# Patient Record
Sex: MALE | Race: WHITE | Employment: FULL TIME | URBAN - METROPOLITAN AREA
[De-identification: names, ages, dates, MRNs, and addresses within clinical notes are randomized per-mention and may not be internally consistent; named-entity substitution may affect disease eponyms.]

---

## 2020-01-09 ENCOUNTER — OFFICE VISIT (OUTPATIENT)
Dept: PRIMARY CARE CLINIC | Age: 25
End: 2020-01-09

## 2020-01-09 VITALS
DIASTOLIC BLOOD PRESSURE: 74 MMHG | SYSTOLIC BLOOD PRESSURE: 114 MMHG | HEART RATE: 61 BPM | RESPIRATION RATE: 16 BRPM | HEIGHT: 73 IN | TEMPERATURE: 97.9 F | OXYGEN SATURATION: 97 % | WEIGHT: 188.8 LBS | BODY MASS INDEX: 25.02 KG/M2

## 2020-01-09 DIAGNOSIS — R05.9 COUGH: Primary | ICD-10-CM

## 2020-01-09 RX ORDER — IBUPROFEN 600 MG/1
600 TABLET ORAL
COMMUNITY
Start: 2015-01-25 | End: 2020-12-16 | Stop reason: ALTCHOICE

## 2020-01-09 RX ORDER — FLUTICASONE PROPIONATE 220 UG/1
1 AEROSOL, METERED RESPIRATORY (INHALATION)
COMMUNITY
End: 2020-12-16 | Stop reason: ALTCHOICE

## 2020-01-09 RX ORDER — FLUTICASONE PROPIONATE 50 MCG
2 SPRAY, SUSPENSION (ML) NASAL
COMMUNITY
Start: 2015-01-25 | End: 2020-12-16 | Stop reason: ALTCHOICE

## 2020-01-09 RX ORDER — PREDNISONE 10 MG/1
TABLET ORAL
COMMUNITY
Start: 2015-05-06 | End: 2020-12-16 | Stop reason: ALTCHOICE

## 2020-01-09 RX ORDER — AMOXICILLIN AND CLAVULANATE POTASSIUM 875; 125 MG/1; MG/1
TABLET, FILM COATED ORAL
COMMUNITY
Start: 2019-11-24 | End: 2020-12-16 | Stop reason: ALTCHOICE

## 2020-01-09 RX ORDER — PREDNISONE 20 MG/1
TABLET ORAL
COMMUNITY
Start: 2019-11-24 | End: 2020-12-16 | Stop reason: ALTCHOICE

## 2020-01-09 RX ORDER — MONTELUKAST SODIUM 10 MG/1
10 TABLET ORAL
COMMUNITY
End: 2020-12-16 | Stop reason: ALTCHOICE

## 2020-01-09 RX ORDER — ALBUTEROL SULFATE 90 UG/1
1 AEROSOL, METERED RESPIRATORY (INHALATION)
COMMUNITY
Start: 2015-05-06 | End: 2020-01-09 | Stop reason: SDUPTHER

## 2020-01-09 RX ORDER — AZITHROMYCIN 250 MG/1
TABLET, FILM COATED ORAL
COMMUNITY
Start: 2015-05-06 | End: 2020-12-16 | Stop reason: ALTCHOICE

## 2020-01-09 RX ORDER — BENZONATATE 200 MG/1
200 CAPSULE ORAL
Qty: 21 CAP | Refills: 0 | Status: SHIPPED | OUTPATIENT
Start: 2020-01-09 | End: 2020-01-16

## 2020-01-09 RX ORDER — SERTRALINE HYDROCHLORIDE 50 MG/1
TABLET, FILM COATED ORAL
COMMUNITY
Start: 2019-10-23

## 2020-01-09 RX ORDER — BUDESONIDE AND FORMOTEROL FUMARATE DIHYDRATE 80; 4.5 UG/1; UG/1
2 AEROSOL RESPIRATORY (INHALATION)
COMMUNITY
Start: 2015-05-06 | End: 2020-12-16 | Stop reason: ALTCHOICE

## 2020-01-09 NOTE — PROGRESS NOTES
Subjective:   Kevan Ho III is a 22 y.o. male who complains of coryza, congestion and dry cough for 5 days, stable since that time. He denies a history of shortness of breath and wheezing. Evaluation to date: none. Treatment to date: OTC products. Patient does not smoke cigarettes. Relevant PMH: No pertinent additional PMH. Patient Active Problem List   Diagnosis Code    RAD (reactive airway disease) J45.909    Acne L70.9    Other tear of cartilage or meniscus of knee, current [id number]    Right hip pain M25.551     Patient Active Problem List    Diagnosis Date Noted    Right hip pain 12/12/2013    Other tear of cartilage or meniscus of knee, current 08/22/2013    Acne 07/30/2013    RAD (reactive airway disease) 08/02/2011     Current Outpatient Medications   Medication Sig Dispense Refill    ibuprofen (MOTRIN) 600 mg tablet Take 600 mg by mouth.  fluticasone propionate (FLOVENT HFA) 220 mcg/actuation inhaler Take 1 Puff by inhalation.  sertraline (ZOLOFT) 50 mg tablet       benzonatate (TESSALON) 200 mg capsule Take 1 Cap by mouth three (3) times daily as needed for Cough for up to 7 days. 21 Cap 0    albuterol (VENTOLIN HFA) 90 mcg/actuation inhaler Take 2 Puffs by inhalation every four (4) hours as needed for Wheezing. 3 Inhaler 4    beclomethasone (QVAR) 80 mcg/actuation inhaler Take 1 Puff by inhalation two (2) times a day. 1 Inhaler 5    amoxicillin-clavulanate (AUGMENTIN) 875-125 mg per tablet       azithromycin (ZITHROMAX) 250 mg tablet Take tablet a day for the next four days. The medication remains active in your system for a total of ten days. Take your first dose in the evening of 5/6/15.  budesonide-formoterol (SYMBICORT) 80-4.5 mcg/actuation HFAA Take 2 Puffs by inhalation.  fluticasone propionate (FLONASE) 50 mcg/actuation nasal spray 2 Sprays by Nasal route.  montelukast (SINGULAIR) 10 mg tablet Take 10 mg by mouth.       predniSONE ([place] Chill) 20 mg tablet       predniSONE (DELTASONE) 10 mg tablet Take three tabs twice a day with food for 3 days.  diclofenac EC (VOLTAREN) 75 mg EC tablet Take 1 Tab by mouth two (2) times a day. 60 Tab 0    oxyCODONE IR (ROXICODONE) 5 mg immediate release tablet Take 1-2 Tabs by mouth every four (4) hours as needed for Pain. Max Daily Amount: 60 mg. 50 Tab 0    omega-3 fatty acids-vitamin e 1,000 mg cap Take 1 Cap by mouth daily.  sertraline (ZOLOFT) 25 mg tablet Take 25 mg by mouth daily. Allergies   Allergen Reactions    Meperidine Rash and Hives     Past Medical History:   Diagnosis Date    Asthma      Past Surgical History:   Procedure Laterality Date    HX HEENT      WISDOM TEETH    HX UROLOGICAL  1997    LEFT KIDNEY SURGERY FOR UPJ OBSTRUCTION     Family History   Problem Relation Age of Onset    Anesth Problems Mother         VOMITING    Heart Disease Maternal Grandmother      Social History     Tobacco Use    Smoking status: Never Smoker    Smokeless tobacco: Never Used   Substance Use Topics    Alcohol use: Yes     Alcohol/week: 1.0 standard drinks     Types: 1 Shots of liquor per week     Comment: once a month        Review of Systems  Pertinent items are noted in HPI. Objective:     Visit Vitals  /74 (BP 1 Location: Left arm, BP Patient Position: Sitting)   Pulse 61   Temp 97.9 °F (36.6 °C) (Oral)   Resp 16   Ht 6' 1\" (1.854 m)   Wt 188 lb 12.8 oz (85.6 kg)   SpO2 97%   BMI 24.91 kg/m²     General:  alert, cooperative, no distress   Eyes: conjunctivae/corneas clear. PERRL, EOM's intact. Fundi benign   Ears: normal TM's and external ear canals AU   Sinuses: Normal paranasal sinuses without tenderness   Mouth:  Lips, mucosa, and tongue normal. Teeth and gums normal   Neck: supple, symmetrical, trachea midline and no adenopathy. Heart: S1 and S2 normal, no murmurs noted. Lungs: clear to auscultation bilaterally   Abdomen: soft, non-tender.  Bowel sounds normal. No masses, no organomegaly        Assessment/Plan:   viral upper respiratory illness  Suggested symptomatic OTC remedies. RTC prn. Discussed diagnosis and treatment of viral URIs. Discussed the importance of avoiding unnecessary antibiotic therapy. ICD-10-CM ICD-9-CM    1. Cough R05 786.2 benzonatate (TESSALON) 200 mg capsule   .

## 2020-01-09 NOTE — PROGRESS NOTES
Brooke Grubbs III is a 22 y.o. male    Room:5    Chief Complaint   Patient presents with    Chest Pain     Pt States \" i have been experiencing chest tightness, chest congestion, and cough but cant cough anything up w/ sore throat, x3 days it has been worse today than the past x3 days and have asthma\". Visit Vitals  /74 (BP 1 Location: Left arm, BP Patient Position: Sitting)   Pulse 61   Temp 97.9 °F (36.6 °C) (Oral)   Resp 16   Ht 6' 1\" (1.854 m)   Wt 188 lb 12.8 oz (85.6 kg)   SpO2 97%   BMI 24.91 kg/m²       Pain Scale: 0 - No pain/10    1. Have you been to the ER, urgent care clinic since your last visit? Hospitalized since your last visit? No    2. Have you seen or consulted any other health care providers outside of the [address] [place] [address] since your last visit? Include any pap smears or colon screening.  No

## 2020-01-09 NOTE — PATIENT INSTRUCTIONS
Cough: Care Instructions  Your Care Instructions    A cough is your body's response to something that bothers your throat or airways. Many things can cause a cough. You might cough because of a cold or the flu, bronchitis, or asthma. Smoking, postnasal drip, allergies, and stomach acid that backs up into your throat also can cause coughs. A cough is a symptom, not a disease. Most coughs stop when the cause, such as a cold, goes away. You can take a few steps at home to cough less and feel better. Follow-up care is a key part of your treatment and safety. Be sure to make and go to all appointments, and call your doctor if you are having problems. It's also a good idea to know your test results and keep a list of the medicines you take. How can you care for yourself at home? · Drink lots of water and other fluids. This helps thin the mucus and soothes a dry or sore throat. Honey or lemon juice in hot water or tea may ease a dry cough. · Take cough medicine as directed by your doctor. · Prop up your head on pillows to help you breathe and ease a dry cough. · Try cough drops to soothe a dry or sore throat. Cough drops don't stop a cough. Medicine-flavored cough drops are no better than candy-flavored drops or hard candy. · Do not smoke. Avoid secondhand smoke. If you need help quitting, talk to your doctor about stop-smoking programs and medicines. These can increase your chances of quitting for good. When should you call for help? Call 911 anytime you think you may need emergency care.  For example, call if:    · You have severe trouble breathing.    Call your doctor now or seek immediate medical care if:    · You cough up blood.     · You have new or worse trouble breathing.     · You have a new or higher fever.     · You have a new rash.    Watch closely for changes in your health, and be sure to contact your doctor if:    · You cough more deeply or more often, especially if you notice more mucus or a change in the color of your mucus.     · You have new symptoms, such as a sore throat, an earache, or sinus pain.     · You do not get better as expected. Where can you learn more? Go to http://destini-nazia.info/. Enter D279 in the search box to learn more about \"Cough: Care Instructions. \"  Current as of: June 9, 2019  Content Version: 12.2  © 6462-7117 [employer]. Care instructions adapted under license by [employer] (which disclaims liability or warranty for this information). If you have questions about a medical condition or this instruction, always ask your healthcare professional. [address] any warranty or liability for your use of this information.

## 2020-07-04 ENCOUNTER — HOSPITAL ENCOUNTER (EMERGENCY)
Age: 25
Discharge: HOME | End: 2020-07-05
Attending: EMERGENCY MEDICINE | Admitting: EMERGENCY MEDICINE
Payer: OTHER MISCELLANEOUS
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
## 2020-07-04 DIAGNOSIS — S61.412A LACERATION OF LEFT HAND WITHOUT FOREIGN BODY, INITIAL ENCOUNTER: ICD-10-CM

## 2020-07-04 DIAGNOSIS — T07.XXXA MULTIPLE CONTUSIONS: ICD-10-CM

## 2020-07-04 PROCEDURE — 99284 EMERGENCY DEPT VISIT MOD MDM: CPT

## 2020-07-04 PROCEDURE — 75810000293 HC SIMP/SUPERF WND  RPR

## 2020-07-05 ENCOUNTER — APPOINTMENT (OUTPATIENT)
Dept: GENERAL RADIOLOGY | Age: 25
End: 2020-07-05
Attending: EMERGENCY MEDICINE
Payer: OTHER MISCELLANEOUS

## 2020-07-05 VITALS
TEMPERATURE: 98.4 F | BODY MASS INDEX: 23.13 KG/M2 | HEIGHT: 75 IN | RESPIRATION RATE: 16 BRPM | WEIGHT: 186 LBS | HEART RATE: 98 BPM | DIASTOLIC BLOOD PRESSURE: 75 MMHG | SYSTOLIC BLOOD PRESSURE: 130 MMHG | OXYGEN SATURATION: 100 %

## 2020-07-05 LAB
ALBUMIN SERPL-MCNC: 4.3 G/DL (ref 3.5–5)
ALBUMIN/GLOB SERPL: 1.2 {RATIO} (ref 1.1–2.2)
ALP SERPL-CCNC: 47 U/L (ref 45–117)
ALT SERPL-CCNC: 18 U/L (ref 12–78)
ANION GAP SERPL CALC-SCNC: 6 MMOL/L (ref 5–15)
AST SERPL-CCNC: 8 U/L (ref 15–37)
ATRIAL RATE: 95 BPM
BASOPHILS # BLD: 0.1 K/UL (ref 0–0.1)
BASOPHILS NFR BLD: 1 % (ref 0–1)
BILIRUB SERPL-MCNC: 0.5 MG/DL (ref 0.2–1)
BUN SERPL-MCNC: 9 MG/DL (ref 6–20)
BUN/CREAT SERPL: 7 (ref 12–20)
CALCIUM SERPL-MCNC: 8.9 MG/DL (ref 8.5–10.1)
CALCULATED P AXIS, ECG09: 73 DEGREES
CALCULATED R AXIS, ECG10: 88 DEGREES
CALCULATED T AXIS, ECG11: 47 DEGREES
CHLORIDE SERPL-SCNC: 106 MMOL/L (ref 97–108)
CO2 SERPL-SCNC: 26 MMOL/L (ref 21–32)
CREAT SERPL-MCNC: 1.26 MG/DL (ref 0.7–1.3)
DIAGNOSIS, 93000: NORMAL
DIFFERENTIAL METHOD BLD: ABNORMAL
EOSINOPHIL # BLD: 0.1 K/UL (ref 0–0.4)
EOSINOPHIL NFR BLD: 0 % (ref 0–7)
ERYTHROCYTE [DISTWIDTH] IN BLOOD BY AUTOMATED COUNT: 11.6 % (ref 11.5–14.5)
GLOBULIN SER CALC-MCNC: 3.7 G/DL (ref 2–4)
GLUCOSE SERPL-MCNC: 111 MG/DL (ref 65–100)
HCT VFR BLD AUTO: 45.4 % (ref 36.6–50.3)
HGB BLD-MCNC: 15.7 G/DL (ref 12.1–17)
IMM GRANULOCYTES # BLD AUTO: 0 K/UL (ref 0–0.04)
IMM GRANULOCYTES NFR BLD AUTO: 0 % (ref 0–0.5)
LYMPHOCYTES # BLD: 1.1 K/UL (ref 0.8–3.5)
LYMPHOCYTES NFR BLD: 9 % (ref 12–49)
MCH RBC QN AUTO: 29.9 PG (ref 26–34)
MCHC RBC AUTO-ENTMCNC: 34.6 G/DL (ref 30–36.5)
MCV RBC AUTO: 86.5 FL (ref 80–99)
MONOCYTES # BLD: 0.8 K/UL (ref 0–1)
MONOCYTES NFR BLD: 7 % (ref 5–13)
NEUTS SEG # BLD: 9.4 K/UL (ref 1.8–8)
NEUTS SEG NFR BLD: 83 % (ref 32–75)
NRBC # BLD: 0 K/UL (ref 0–0.01)
NRBC BLD-RTO: 0 PER 100 WBC
P-R INTERVAL, ECG05: 144 MS
PLATELET # BLD AUTO: 214 K/UL (ref 150–400)
PMV BLD AUTO: 9.8 FL (ref 8.9–12.9)
POTASSIUM SERPL-SCNC: 3.6 MMOL/L (ref 3.5–5.1)
PROT SERPL-MCNC: 8 G/DL (ref 6.4–8.2)
Q-T INTERVAL, ECG07: 330 MS
QRS DURATION, ECG06: 82 MS
QTC CALCULATION (BEZET), ECG08: 414 MS
RBC # BLD AUTO: 5.25 M/UL (ref 4.1–5.7)
SODIUM SERPL-SCNC: 138 MMOL/L (ref 136–145)
VENTRICULAR RATE, ECG03: 95 BPM
WBC # BLD AUTO: 11.4 K/UL (ref 4.1–11.1)

## 2020-07-05 PROCEDURE — 85025 COMPLETE CBC W/AUTO DIFF WBC: CPT

## 2020-07-05 PROCEDURE — 93005 ELECTROCARDIOGRAM TRACING: CPT

## 2020-07-05 PROCEDURE — 71045 X-RAY EXAM CHEST 1 VIEW: CPT

## 2020-07-05 PROCEDURE — 36415 COLL VENOUS BLD VENIPUNCTURE: CPT

## 2020-07-05 PROCEDURE — 80053 COMPREHEN METABOLIC PANEL: CPT

## 2020-07-05 NOTE — ED NOTES
Patient was provided with discharge instructions. Instructions and any medications were reviewed with the patient &/or family by Dr. Bird Akers. Questions and concerns addressed by the provider. Patient discharged in stable condition via ambulation  and was escorted by his mother.

## 2020-07-05 NOTE — DISCHARGE INSTRUCTIONS

## 2020-07-05 NOTE — ED NOTES
Wound care being done to hands at this time. Concern for glass shard in the palm of the left hand. MD at bedside.
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
## 2020-12-16 ENCOUNTER — OFFICE VISIT (OUTPATIENT)
Dept: PRIMARY CARE CLINIC | Age: 25
End: 2020-12-16

## 2020-12-16 VITALS
HEART RATE: 91 BPM | WEIGHT: 191.4 LBS | BODY MASS INDEX: 23.8 KG/M2 | OXYGEN SATURATION: 97 % | TEMPERATURE: 98 F | SYSTOLIC BLOOD PRESSURE: 108 MMHG | RESPIRATION RATE: 18 BRPM | HEIGHT: 75 IN | DIASTOLIC BLOOD PRESSURE: 79 MMHG

## 2020-12-16 DIAGNOSIS — Z20.2 EXPOSURE TO STD: ICD-10-CM

## 2020-12-16 DIAGNOSIS — N50.811 PAIN IN RIGHT TESTICLE: Primary | ICD-10-CM

## 2020-12-16 DIAGNOSIS — Z23 NEEDS FLU SHOT: ICD-10-CM

## 2020-12-16 DIAGNOSIS — N45.1 EPIDIDYMITIS: ICD-10-CM

## 2020-12-16 PROCEDURE — 99213 OFFICE O/P EST LOW 20 MIN: CPT | Performed by: NURSE PRACTITIONER

## 2020-12-16 PROCEDURE — 90471 IMMUNIZATION ADMIN: CPT | Performed by: NURSE PRACTITIONER

## 2020-12-16 PROCEDURE — 90686 IIV4 VACC NO PRSV 0.5 ML IM: CPT | Performed by: NURSE PRACTITIONER

## 2020-12-16 RX ORDER — DOXYCYCLINE 100 MG/1
100 CAPSULE ORAL 2 TIMES DAILY
Qty: 20 CAP | Refills: 0 | Status: SHIPPED | OUTPATIENT
Start: 2020-12-16 | End: 2020-12-26

## 2020-12-16 NOTE — PATIENT INSTRUCTIONS
Testicular Pain: Care Instructions  Your Care Instructions     Pain in the testicles can be caused by many things. These include an injury to your testicles, an infection, and testicular torsion. Injuries and genital problems most often happen during sports or recreational activities, at work, or in a fall. Pain caused by an injury usually goes away quickly. There is usually no long-term harm to your testicles. Infections that may cause pain include:  · An infection of the testicles. This is called orchitis. · An abscess in the scrotum or testicles. · Some sexually transmitted infections (STIs). · A swelling of the tube attached to a testicle. This swelling is called epididymitis. It can cause pain and is sometimes caused by an infection. Testicular torsion happens when a testicle twists on the spermatic cord. This cuts off the blood supply to the testicle. This is a serious condition that requires surgery. Follow-up care is a key part of your treatment and safety. Be sure to make and go to all appointments, and call your doctor if you are having problems. It's also a good idea to know your test results and keep a list of the medicines you take. How can you care for yourself at home? · Rest and protect your testicles and groin. Stop, change, or take a break from any activity that may be causing your pain or soreness. · Put ice or a cold pack on the area for 10 to 20 minutes at a time. Put a thin cloth between the ice and your skin. · Wear briefs, not boxers. Briefs help support the injured area. You can use a jock strap if it helps relieve your pain. · If your doctor prescribed antibiotics, take them as directed. Do not stop taking them just because you feel better. You need to take the full course of antibiotics. · Ask your doctor if you can take an over-the-counter pain medicine, such as acetaminophen (Tylenol), ibuprofen (Advil, Motrin), or naproxen (Aleve). Be safe with medicines.  Read and follow all instructions on the label. · If the doctor gave you a prescription medicine for pain, take it as prescribed. When should you call for help? Call your doctor now or seek immediate medical care if:    · You have severe or increasing pain.     · You notice a change in how your testicles look or are positioned in your scrotum.     · You notice new or worse swelling in your scrotum.     · You have symptoms of a urinary problem, such as a urinary tract infection. These may include:  ? Pain or burning when you urinate. ? A frequent need to urinate without being able to pass much urine. ? Pain in the flank, which is just below the rib cage and above the waist on either side of the back. ? Blood in your urine. ? A fever. Watch closely for changes in your health, and be sure to contact your doctor if:    · You do not get better as expected. Where can you learn more? Go to http://www.gray.com/  Enter X349 in the search box to learn more about \"Testicular Pain: Care Instructions. \"  Current as of: June 29, 2020               Content Version: 12.6  © 1155-9368 [employer]. Care instructions adapted under license by [employer] (which disclaims liability or warranty for this information). If you have questions about a medical condition or this instruction, always ask your healthcare professional. [place] [address] any warranty or liability for your use of this information. Vaccine Information Statement    Influenza (Flu) Vaccine (Inactivated or Recombinant): What You Need to Know    Many Vaccine Information Statements are available in [place]ian and other languages. See www.immunize.org/vis  Hojas de información sobre vacunas están disponibles en español y en muchos otros idiomas. Visite www.immunize.org/vis    1. Why get vaccinated? Influenza vaccine can prevent influenza (flu).     Flu is a contagious disease that spreads around the [facility] every year, usually between October and May. Anyone can get the flu, but it is more dangerous for some people. Infants and young children, people 72years of age and older, pregnant women, and people with certain health conditions or a weakened immune system are at greatest risk of flu complications. Pneumonia, bronchitis, sinus infections and ear infections are examples of flu-related complications. If you have a medical condition, such as heart disease, cancer or diabetes, flu can make it worse. Flu can cause fever and chills, sore throat, muscle aches, fatigue, cough, headache, and runny or stuffy nose. Some people may have vomiting and diarrhea, though this is more common in children than adults. Each year thousands of people in the [facility] die from flu, and many more are hospitalized. Flu vaccine prevents millions of illnesses and flu-related visits to the doctor each year. 2. Influenza vaccines     CDC recommends everyone 10months of age and older get vaccinated every flu season. Children 6 months through 6years of age may need 2 doses during a single flu season. Everyone else needs only 1 dose each flu season. It takes about 2 weeks for protection to develop after vaccination. There are many flu viruses, and they are always changing. Each year a new flu vaccine is made to protect against three or four viruses that are likely to cause disease in the upcoming flu season. Even when the vaccine doesnt exactly match these viruses, it may still provide some protection. Influenza vaccine does not cause flu. Influenza vaccine may be given at the same time as other vaccines. 3. Talk with your health care provider    Tell your vaccine provider if the person getting the vaccine:   Has had an allergic reaction after a previous dose of influenza vaccine, or has any severe, life-threatening allergies.  Has ever had Guillain-Barré Syndrome (also called GBS).     In some cases, your health care provider may decide to postpone influenza vaccination to a future visit. People with minor illnesses, such as a cold, may be vaccinated. People who are moderately or severely ill should usually wait until they recover before getting influenza vaccine. Your health care provider can give you more information. 4. Risks of a reaction     Soreness, redness, and swelling where shot is given, fever, muscle aches, and headache can happen after influenza vaccine.  There may be a very small increased risk of Guillain-Barré Syndrome (GBS) after inactivated influenza vaccine (the flu shot). Yee Current children who get the flu shot along with pneumococcal vaccine (PCV13), and/or DTaP vaccine at the same time might be slightly more likely to have a seizure caused by fever. Tell your health care provider if a child who is getting flu vaccine has ever had a seizure. People sometimes faint after medical procedures, including vaccination. Tell your provider if you feel dizzy or have vision changes or ringing in the ears. As with any medicine, there is a very remote chance of a vaccine causing a severe allergic reaction, other serious injury, or death. 5. What if there is a serious problem? An allergic reaction could occur after the vaccinated person leaves the clinic. If you see signs of a severe allergic reaction (hives, swelling of the face and throat, difficulty breathing, a fast heartbeat, dizziness, or weakness), call 9-1-1 and get the person to the nearest hospital.    For other signs that concern you, call your health care provider. Adverse reactions should be reported to the Vaccine Adverse Event Reporting System (VAERS). Your health care provider will usually file this report, or you can do it yourself. Visit the VAERS website at www.vaers. hhs.gov or call 3-491.870.5694. VAERS is only for reporting reactions, and VAERS staff do not give medical advice.     6. The [facility] Vaccine Injury Compensation Program    The National Vaccine Injury Compensation Program (VICP) is a federal program that was created to compensate people who may have been injured by certain vaccines. Visit the VICP website at www.hrsa.gov/vaccinecompensation or call 6-617.612.9615 to learn about the program and about filing a claim. There is a time limit to file a claim for compensation. 7. How can I learn more?  Ask your health care provider.  Call your local or state health department.  Contact the Centers for Disease Control and Prevention (CDC):  - Call 0-756.365.3256 (1-800-CDC-INFO) or  - Visit CDCs influenza website at www.cdc.gov/flu    Vaccine Information Statement (Interim)  Inactivated Influenza Vaccine   8/15/2019  42 JASON No [id number]   Department of Health and Human Services  Centers for Disease Control and Prevention    Office Use Only

## 2020-12-16 NOTE — PROGRESS NOTES
HISTORY OF PRESENT ILLNESS  Shayy Mobley III is a 26 y.o. male. Right testicle pain x 2 weeks . Also states he had bloody ejaculate yesterday. Asked mother (who works for urology) about it. Stated it is common, No edema, trauma, no redness. Pain is more an ache on the right with no radiation. Worse when wearing work pants with all the gear on it. Noticed not bulging. Not worse with weight lifting. No fever, no pain with urination or with bowel movement. Did report having treatment for chlamydia 1 month ago. Took appropriated Rx course. Has unprotected sex 50% of the time. Groin Pain  The history is provided by the patient. This is a new problem. The current episode started more than 1 week ago. The problem occurs daily. The problem has not changed since onset. Pertinent negatives include no abdominal pain and no headaches. Past Medical History:   Diagnosis Date    Asthma      Past Surgical History:   Procedure Laterality Date    HX HEENT      WISDOM TEETH    HX UROLOGICAL  1997    LEFT KIDNEY SURGERY FOR UPJ OBSTRUCTION     Allergies   Allergen Reactions    Meperidine Rash and Hives         Review of Systems   Constitutional: Negative for fever, malaise/fatigue and weight loss. Gastrointestinal: Negative for abdominal pain and blood in stool. Genitourinary: Negative for dysuria, flank pain and hematuria. Musculoskeletal: Negative for myalgias. Neurological: Negative for dizziness and headaches. All other systems reviewed and are negative. Physical Exam  Vitals signs and nursing note reviewed. Constitutional:       General: He is not in acute distress. Appearance: Normal appearance. He is normal weight. HENT:      Head: Normocephalic and atraumatic. Eyes:      Pupils: Pupils are equal, round, and reactive to light. Neck:      Musculoskeletal: Normal range of motion. Cardiovascular:      Rate and Rhythm: Normal rate.    Pulmonary:      Effort: Pulmonary effort is normal.   Abdominal:      General: Abdomen is flat. There is no distension. Hernia: No hernia is present. There is no hernia in the right inguinal area. Genitourinary:     Penis: Normal and circumcised. No tenderness or swelling. Testes: Normal.         Right: Mass, tenderness, swelling, testicular hydrocele or varicocele not present. Epididymis:      Right: Normal.      Comments: Normal testicular exam.  Lymphadenopathy:      Lower Body: No right inguinal adenopathy. Skin:     General: Skin is warm. Neurological:      Mental Status: He is alert. ASSESSMENT and PLAN    ICD-10-CM ICD-9-CM    1. Pain in right testicle  N50.811 608.9 US SCROTUM/TESTICLES      CHLAMYDIA/GC PCR   2. Epididymitis  N45.1 604.90    3. Exposure to STD  Z20.2 V01.6 CHLAMYDIA/GC PCR   4. Needs flu shot  Z23 V04.81 INFLUENZA VIRUS VAC QUAD,SPLIT,PRESV FREE SYRINGE IM     Encounter Diagnoses   Name Primary?  Pain in right testicle Yes    Epididymitis     Exposure to STD     Needs flu shot      Orders Placed This Encounter    CHLAMYDIA/GC PCR    US SCROTUM/TESTICLES    Influenza Virus Vaccine QUAD, PF Syr 6 Months + (Flulaval, Fluzone, Fluarix [id number])    doxycycline (VIBRAMYCIN) 100 mg capsule   Medication as directed  Ultrasound- will call with results  Will call with Urine results  I have discussed with the patient the diagnosis  and intended plan as seen in the orders. Patient received AVS , all questions answered concerning all future plans. I have discussed medication side effects and warnings with the patient/ guardian. Patient instructed to go to ER if symptoms worsen or fail to improve.   Signed By: FAUSTO Vivas     December 16, 2020      Signed By: FAUSTO Vivas     December 16, 2020

## 2020-12-16 NOTE — PROGRESS NOTES
Chief Complaint   Patient presents with    Groin Pain     Pt states he has had pain for 1-2 weeks in right testicle, not a sharp pain, but a nagging pain. Pt has recently started lifting weights. 1. Have you been to the ER, urgent care clinic since your last visit? Hospitalized since your last visit? [facility] 1 month ago for chlamydia, finished the course of antibiotics    2. Have you seen or consulted any other health care providers outside of the [address] [place] [address] since your last visit? Include any pap smears or colon screening. Yes When: Dr. Jeri Solis PCP 4 months ago    Visit Vitals  /79 (BP 1 Location: Left arm, BP Patient Position: Sitting)   Pulse 91   Temp 98 °F (36.7 °C) (Oral)   Resp 18   Ht 6' 3\" (1.905 m)   Wt 191 lb 6.4 oz (86.8 kg)   SpO2 97%   BMI 23.92 kg/m²     Order placed for flu vaccine, per Verbal Order from [facility] on 12/16/2020. Administered Flu vaccine, pt tolerated well.

## 2020-12-18 LAB
C TRACH RRNA SPEC QL NAA+PROBE: NEGATIVE
N GONORRHOEA RRNA SPEC QL NAA+PROBE: NEGATIVE

## 2021-07-26 ENCOUNTER — OFFICE VISIT (OUTPATIENT)
Dept: PRIMARY CARE CLINIC | Age: 26
End: 2021-07-26
Payer: COMMERCIAL

## 2021-07-26 VITALS
HEIGHT: 75 IN | DIASTOLIC BLOOD PRESSURE: 70 MMHG | OXYGEN SATURATION: 97 % | TEMPERATURE: 97.4 F | RESPIRATION RATE: 16 BRPM | SYSTOLIC BLOOD PRESSURE: 102 MMHG | HEART RATE: 67 BPM | WEIGHT: 188.6 LBS | BODY MASS INDEX: 23.45 KG/M2

## 2021-07-26 DIAGNOSIS — N45.1 EPIDIDYMITIS: Primary | ICD-10-CM

## 2021-07-26 DIAGNOSIS — N50.82 SCROTAL PAIN: ICD-10-CM

## 2021-07-26 PROCEDURE — 99213 OFFICE O/P EST LOW 20 MIN: CPT | Performed by: NURSE PRACTITIONER

## 2021-07-26 PROCEDURE — 81003 URINALYSIS AUTO W/O SCOPE: CPT | Performed by: NURSE PRACTITIONER

## 2021-07-26 RX ORDER — SULFAMETHOXAZOLE AND TRIMETHOPRIM 800; 160 MG/1; MG/1
1 TABLET ORAL 2 TIMES DAILY
Qty: 20 TABLET | Refills: 0 | Status: SHIPPED | OUTPATIENT
Start: 2021-07-26 | End: 2021-08-05

## 2021-07-26 RX ORDER — SULFAMETHOXAZOLE AND TRIMETHOPRIM 800; 160 MG/1; MG/1
TABLET ORAL
COMMUNITY
Start: 2021-05-11 | End: 2021-07-26 | Stop reason: ALTCHOICE

## 2021-07-26 NOTE — PATIENT INSTRUCTIONS
Testicular Pain: Care Instructions  Your Care Instructions     Pain in the testicles can be caused by many things. These include an injury to your testicles, an infection, and testicular torsion. Injuries and genital problems most often happen during sports or recreational activities, at work, or in a fall. Pain caused by an injury usually goes away quickly. There is usually no long-term harm to your testicles. Infections that may cause pain include:  · An infection of the testicles. This is called orchitis. · An abscess in the scrotum or testicles. · Some sexually transmitted infections (STIs). · A swelling of the tube attached to a testicle. This swelling is called epididymitis. It can cause pain and is sometimes caused by an infection. Testicular torsion happens when a testicle twists on the spermatic cord. This cuts off the blood supply to the testicle. This is a serious condition that requires surgery. Follow-up care is a key part of your treatment and safety. Be sure to make and go to all appointments, and call your doctor if you are having problems. It's also a good idea to know your test results and keep a list of the medicines you take. How can you care for yourself at home? · Rest and protect your testicles and groin. Stop, change, or take a break from any activity that may be causing your pain or soreness. · Put ice or a cold pack on the area for 10 to 20 minutes at a time. Put a thin cloth between the ice and your skin. · Wear briefs, not boxers. Briefs help support the injured area. You can use a jock strap if it helps relieve your pain. · If your doctor prescribed antibiotics, take them as directed. Do not stop taking them just because you feel better. You need to take the full course of antibiotics. · Ask your doctor if you can take an over-the-counter pain medicine, such as acetaminophen (Tylenol), ibuprofen (Advil, Motrin), or naproxen (Aleve). Be safe with medicines.  Read and follow all instructions on the label. · If the doctor gave you a prescription medicine for pain, take it as prescribed. When should you call for help? Call your doctor now or seek immediate medical care if:    · You have severe or increasing pain.     · You notice a change in how your testicles look or are positioned in your scrotum.     · You notice new or worse swelling in your scrotum.     · You have symptoms of a urinary problem, such as a urinary tract infection. These may include:  ? Pain or burning when you urinate. ? A frequent need to urinate without being able to pass much urine. ? Pain in the flank, which is just below the rib cage and above the waist on either side of the back. ? Blood in your urine. ? A fever. Watch closely for changes in your health, and be sure to contact your doctor if:    · You do not get better as expected. Where can you learn more? Go to http://www.gray.com/  Enter O403 in the search box to learn more about \"Testicular Pain: Care Instructions. \"  Current as of: June 29, 2020               Content Version: 12.8  © 2006-2021 [employer]. Care instructions adapted under license by [employer] (which disclaims liability or warranty for this information). If you have questions about a medical condition or this instruction, always ask your healthcare professional. [place] [address] any warranty or liability for your use of this information.

## 2021-07-26 NOTE — PROGRESS NOTES
Fawad Cox III is a 26 y.o. male    Room:4    Chief Complaint   Patient presents with    Groin Pain     Pt c/o right side groin pain. Pt States \" started about x2 weeks ago dave had it before, i think its fro lifting or moving or unprotected sex but its of those 3\". Visit Vitals  /70 (BP 1 Location: Left arm, BP Patient Position: Sitting, BP Cuff Size: Adult)   Pulse 67   Temp 97.4 °F (36.3 °C) (Oral)   Resp 16   Ht 6' 3\" (1.905 m)   Wt 188 lb 9.6 oz (85.5 kg)   SpO2 97%   BMI 23.57 kg/m²       Pain Scale: 1/10    1. Have you been to the ER, urgent care clinic since your last visit? Hospitalized since your last visit?no    2. Have you seen or consulted any other health care providers outside of the [address] [place] [address] since your last visit? Include any pap smears or colon screening.  no

## 2021-07-26 NOTE — PROGRESS NOTES
HISTORY OF PRESENT ILLNESS  Ramakrishna Nugent III is a 26 y.o. male. HPI  Chief Complaint   Patient presents with    Groin Pain     Pt c/o right side groin pain. Pt States \" started about x2 weeks ago dave had it before, i think its fro lifting or moving or unprotected sex but its of those 3\". Pt presents with complaints of right testicular pain for 2 weeks. He describes as a nagging ache. Has noticed some mild swelling and tenderness to testicle. Denies any dysuria, hematuria, or urethral discharge. Denies any fevers, chills, abdominal pain, N/V. Hx epididymitis. Seen by Urology in past.  Did not tolerate doxy in past but tolerates Bactrim. One new sexual partner in last 6 mo and has had unprotected intercourse. Hx chlamydia. Past Medical History:   Diagnosis Date    Asthma      Past Surgical History:   Procedure Laterality Date    HX HEENT      WISDOM TEETH    HX UROLOGICAL  1997    LEFT KIDNEY SURGERY FOR UPJ OBSTRUCTION     Allergies   Allergen Reactions    Doxycycline Unknown (comments)     Upsets stomach    Meperidine Rash and Hives         ROS  A comprehensive review of systems was obtained and negative except findings in the HPI    Physical Exam  Constitutional:       General: He is not in acute distress. Appearance: Normal appearance. He is not ill-appearing or toxic-appearing. HENT:      Head: Normocephalic and atraumatic. Abdominal:      General: Abdomen is flat. Bowel sounds are normal. There is no distension. Palpations: Abdomen is soft. Tenderness: There is no abdominal tenderness. There is no guarding or rebound. Genitourinary:     Comments: Exam declined by pt  Neurological:      Mental Status: He is alert.        Results for orders placed or performed in visit on 07/26/21   AMB POC URINALYSIS DIP STICK AUTO W/O MICRO   Result Value Ref Range    Color (UA POC) Yellow     Clarity (UA POC) Clear     Glucose (UA POC) Negative Negative    Bilirubin (UA POC) Negative Negative    Ketones (UA POC) Negative Negative    Specific gravity (UA POC) 1.025 1.001 - 1.035    Blood (UA POC) Negative Negative    pH (UA POC) 7.0 4.6 - 8.0    Protein (UA POC) Negative Negative    Urobilinogen (UA POC) 1 mg/dL 0.2 - 1    Nitrites (UA POC) Negative Negative    Leukocyte esterase (UA POC) Negative Negative         ASSESSMENT and PLAN    ICD-10-CM ICD-9-CM    1. Epididymitis  N45.1 604.90    2. Scrotal pain  N50.82 608.9 AMB POC URINALYSIS DIP STICK AUTO W/O MICRO      CULTURE, URINE      CULTURE, URINE      CT/NG/T.VAGINALIS AMPLIFICATION      CT/NG/T.VAGINALIS AMPLIFICATION     Orders Placed This Encounter    CULTURE, URINE    CT/NG/T.VAGINALIS AMPLIFICATION    AMB POC URINALYSIS DIP STICK AUTO W/O MICRO    DISCONTD: trimethoprim-sulfamethoxazole (BACTRIM DS, SEPTRA DS) 160-800 mg per tablet    trimethoprim-sulfamethoxazole (BACTRIM DS, SEPTRA DS) 160-800 mg per tablet     Treatment per orders. If continued symptoms, follow-up with Urology. RTC prn.     Thad Yee, REBEKAH

## 2021-07-27 LAB
BILIRUB UR QL STRIP: NEGATIVE
GLUCOSE UR-MCNC: NEGATIVE MG/DL
KETONES P FAST UR STRIP-MCNC: NEGATIVE MG/DL
PH UR STRIP: 7 [PH] (ref 4.6–8)
PROT UR QL STRIP: NEGATIVE
SP GR UR STRIP: 1.02 (ref 1–1.03)
UA UROBILINOGEN AMB POC: NORMAL (ref 0.2–1)
URINALYSIS CLARITY POC: CLEAR
URINALYSIS COLOR POC: YELLOW
URINE BLOOD POC: NEGATIVE
URINE LEUKOCYTES POC: NEGATIVE
URINE NITRITES POC: NEGATIVE

## 2021-07-29 LAB
BACTERIA UR CULT: NO GROWTH
C TRACH RRNA SPEC QL NAA+PROBE: NEGATIVE
N GONORRHOEA RRNA SPEC QL NAA+PROBE: NEGATIVE
SPECIMEN STATUS REPORT, ROLRST: NORMAL
T VAGINALIS DNA SPEC QL NAA+PROBE: NEGATIVE

## 2022-03-23 ENCOUNTER — OFFICE VISIT (OUTPATIENT)
Dept: PRIMARY CARE CLINIC | Age: 27
End: 2022-03-23

## 2022-03-23 VITALS
DIASTOLIC BLOOD PRESSURE: 79 MMHG | WEIGHT: 198 LBS | TEMPERATURE: 97.7 F | HEIGHT: 75 IN | RESPIRATION RATE: 16 BRPM | OXYGEN SATURATION: 97 % | BODY MASS INDEX: 24.62 KG/M2 | SYSTOLIC BLOOD PRESSURE: 119 MMHG | HEART RATE: 73 BPM

## 2022-03-23 DIAGNOSIS — J30.1 SEASONAL ALLERGIC RHINITIS DUE TO POLLEN: ICD-10-CM

## 2022-03-23 DIAGNOSIS — R09.81 NASAL CONGESTION: Primary | ICD-10-CM

## 2022-03-23 LAB — SARS-COV-2 PCR, POC: NEGATIVE

## 2022-03-23 PROCEDURE — 99213 OFFICE O/P EST LOW 20 MIN: CPT | Performed by: NURSE PRACTITIONER

## 2022-03-23 PROCEDURE — 87635 SARS-COV-2 COVID-19 AMP PRB: CPT | Performed by: NURSE PRACTITIONER

## 2022-03-23 NOTE — PATIENT INSTRUCTIONS
Using a Nasal Steroid Spray: Care Instructions  Your Care Instructions     Your doctor may suggest using a corticosteroid nasal spray for your allergy symptoms or sinus problems. These sprays reduce the swelling inside the nose and sinuses. Unlike decongestant nasal sprays, steroid sprays won't lead to more swelling when you stop taking them. These sprays start working in a few days, but it may take several weeks before you get the full effect. Most side effects are minor. The most common complaint is a burning feeling in the nose right after the spray is used. Some people get nosebleeds. Follow-up care is a key part of your treatment and safety. Be sure to make and go to all appointments, and call your doctor if you are having problems. It's also a good idea to know your test results and keep a list of the medicines you take. How can you care for yourself at home? Here are some tips for using these sprays:  · You may need to prime the sprayer before you use it. This means spraying it into the air a few times to make sure you get the right amount of medicine. Follow the directions on the label. · Blow your nose before you spray. This will help clear out your nostrils. · Gently sniff the medicine into your nose as you spray. Don't snort, or the medicine will go all the way into your throat where it won't do much good. · Aim the nozzle straight toward the outer wall of your nostril. This will help keep the medicine from irritating the inner walls of your nose, especially your septum (the wall that separates your left and right nostrils). · Don't blow your nose for 10 minutes or so after you spray. And try not to sneeze. · Be safe with medicines. Use this medicine exactly as prescribed. Call your doctor if you think you are having a problem with your medicine. · Clean your sprayer once a week. Read the label to learn how. When should you call for help?   Watch closely for changes in your health, and be sure to contact your doctor if you have any problems. Where can you learn more? Go to http://www.gray.com/  Enter Q048 in the search box to learn more about \"Using a Nasal Steroid Spray: Care Instructions. \"  Current as of: September 8, 2021               Content Version: 13.2  © 8320-8628 [employer]. Care instructions adapted under license by [employer] (which disclaims liability or warranty for this information). If you have questions about a medical condition or this instruction, always ask your healthcare professional. [address] any warranty or liability for your use of this information.

## 2022-03-23 NOTE — PROGRESS NOTES
Chief Complaint   Patient presents with    Cold Symptoms     Pt having issues sleeping due to congestion. This started last night.       Visit Vitals  /79   Pulse 73   Temp 97.7 °F (36.5 °C)   Resp 16   Ht 6' 3\" (1.905 m)   Wt 198 lb (89.8 kg)   SpO2 97%   BMI 24.75 kg/m²

## 2022-03-23 NOTE — PROGRESS NOTES
Subjective:   Melanie Brunner presents for evaluation of Cold Symptoms (Pt having issues sleeping due to congestion. This started last night. )     This started 1 day ago, around 11pm last night, and is stable since that time. He also reports rhinorrhea and yellow nasal discharge. He denies a history of: fever, fatigue, headache, bilateral sinus pain, sore throat, dry cough, SOB and PAIZ. Treatments have included: antihistamines, with moderate improvement. Relevant PMH: No pertinent additional PMH. Patient reports sick contacts: no    /79   Pulse 73   Temp 97.7 °F (36.5 °C)   Resp 16   Ht 6' 3\" (1.905 m)   Wt 198 lb (89.8 kg)   SpO2 97%   BMI 24.75 kg/m²     Physical Exam  General: alert, cooperative, no distress, appears stated age  Eye exam: negative  Ear exam: normal TM's and external ear canals AU  Sinus exam: Normal paranasal sinuses without tenderness  Oropharynx exam: Lips, mucosa, and tongue normal. Teeth and gums normal and normal findings: oropharynx pink & moist without lesions or evidence of thrush  Neck exam: supple, symmetrical, trachea midline and no adenopathy  Heart exam: normal rate, regular rhythm, normal S1, S2, no murmurs, rubs, clicks or gallops  Lung exam: heart and lung exam deferred as wearing kevlar vest ()    Results for orders placed or performed in visit on 03/23/22   POCT COVID-19, SARS-COV-2, PCR   Result Value Ref Range    SARS-COV-2 PCR, POC Negative Negative       Assessment/Plan:   1. Nasal congestion  -     POCT COVID-19, SARS-COV-2, PCR  2. Seasonal allergic rhinitis due to pollen    Continue antihistamine, add Flonase and Sudafed if needed. The above diagnosis is a new problem. We discussed expected course, resolution, and complications of diagnosis in detail. No follow-ups on file. An electronic signature was used to authenticate this note.   -- Elias Vaughan NP

## 2022-07-18 ENCOUNTER — OFFICE VISIT (OUTPATIENT)
Dept: PRIMARY CARE CLINIC | Age: 27
End: 2022-07-18

## 2022-07-18 VITALS
WEIGHT: 196 LBS | HEIGHT: 74 IN | SYSTOLIC BLOOD PRESSURE: 115 MMHG | DIASTOLIC BLOOD PRESSURE: 74 MMHG | BODY MASS INDEX: 25.15 KG/M2 | RESPIRATION RATE: 18 BRPM | OXYGEN SATURATION: 97 % | HEART RATE: 73 BPM | TEMPERATURE: 97.8 F

## 2022-07-18 DIAGNOSIS — Z20.2 POSSIBLE EXPOSURE TO STD: Primary | ICD-10-CM

## 2022-07-18 PROBLEM — J45.990 EXERCISE-INDUCED ASTHMA: Status: ACTIVE | Noted: 2022-01-26

## 2022-07-18 PROCEDURE — 99212 OFFICE O/P EST SF 10 MIN: CPT | Performed by: NURSE PRACTITIONER

## 2022-07-18 RX ORDER — ALPRAZOLAM 0.25 MG/1
0.25 TABLET ORAL
COMMUNITY
Start: 2022-05-23

## 2022-07-18 NOTE — PATIENT INSTRUCTIONS
Exposure to Sexually Transmitted Infections: Care Instructions  Overview  Sexually transmitted infections (STIs) are diseases spread by sexual contact. This includes vaginal, oral, and anal sex. If you're pregnant, you can also spread them to your baby before or during the birth. There are at least 20 different STIs. They include chlamydia, gonorrhea, syphilis, and human immunodeficiency virus (HIV). (This is the virus that causes AIDS.) Some STIs can reduce the chance of getting pregnant in the future. Treatment can cure STIs caused by bacteria. STIs caused by viruses, such as HIV, can be treated, but they can't be cured. Follow-up care is a key part of your treatment and safety. Be sure to make and go to all appointments, and call your doctor if you are having problems. It's also a good idea to know your test results and keep a list of the medicines you take. How can you care for yourself at home? · Take medicines exactly as prescribed. · If your doctor prescribed antibiotics, take them as directed. Don't stop taking them just because you feel better. You need to take the full course of antibiotics. · Tell your sex partner(s) that they will need treatment. · Don't douche. Douching changes the normal balance of bacteria in your vagina. It may increase the risk of spreading the infection to your uterus or other reproductive organs. How can you prevent sexually transmitted infections (STIs)? You can help prevent STIs if you wait to have sex with a new partner (or partners) until you've each been tested for STIs. It also helps if you use condoms (male or female condoms) and if you limit your sex partners to one person who only has sex with you. When should you call for help? Call your doctor now or seek immediate medical care if:    · You have new pain in your belly or pelvis.     · You have symptoms of a urinary tract infection. These may include:  ? Pain or burning when you urinate.   ? A frequent need to urinate without being able to pass much urine. ? Pain in the flank, which is just below the rib cage and above the waist on either side of the back. ? Blood in your urine. ? A fever.     · You have new or worsening pain or swelling in the scrotum. Watch closely for changes in your health, and be sure to contact your doctor if:    · You have unusual vaginal bleeding.     · You have a discharge from the vagina or penis.     · You have any new symptoms, such as sores, bumps, rashes, blisters, or warts.     · You have itching, tingling, pain, or burning in the genital or anal area.     · You think you may have an STI. Where can you learn more? Go to http://www.gray.com/  Enter M049 in the search box to learn more about \"Exposure to Sexually Transmitted Infections: Care Instructions. \"  Current as of: November 17, 2021               Content Version: 13.2  © 2006-2022 [employer]. Care instructions adapted under license by [employer] (which disclaims liability or warranty for this information). If you have questions about a medical condition or this instruction, always ask your healthcare professional. [place] [address] any warranty or liability for your use of this information.

## 2022-07-18 NOTE — PROGRESS NOTES
Chief Complaint   Patient presents with    Other     Patient states that he wants to be checked for STDs     Visit Vitals  /74   Pulse 73   Temp 97.8 °F (36.6 °C)   Resp 18   Ht 6' 2\" (1.88 m)   Wt 196 lb (88.9 kg)   SpO2 97%   BMI 25.16 kg/m²

## 2022-07-18 NOTE — PROGRESS NOTES
HISTORY OF PRESENT ILLNESS  Nunu Dugan III is a 23 y.o. male. Patient here for STD testing  He is asymptomatic. \"Unsure of partner \" from several months ago. Not in an relationship.      2 months ago last  imtimate relationship which  ended . Did have a previous infection with chlamydia. Was asymptomatic . Tests after every new partner with unprotected sex. No fever, discharge, pelvic pain. Past Medical History:   Diagnosis Date    Asthma      Past Surgical History:   Procedure Laterality Date    HX HEENT      WISDOM TEETH    HX ORTHOPAEDIC  2017    Left Hip Surgery     HX UROLOGICAL  1997    LEFT KIDNEY SURGERY FOR UPJ OBSTRUCTION       Review of Systems   Constitutional: Negative for fever. Gastrointestinal: Negative for abdominal pain. Genitourinary: Negative for dysuria, flank pain, frequency and urgency. All other systems reviewed and are negative. Physical Exam  Constitutional:       Appearance: Normal appearance. He is normal weight. HENT:      Head: Normocephalic and atraumatic. Eyes:      Pupils: Pupils are equal, round, and reactive to light. Cardiovascular:      Rate and Rhythm: Normal rate. Pulses: Normal pulses. Pulmonary:      Effort: Pulmonary effort is normal.   Genitourinary:     Comments: deferred  Musculoskeletal:      Cervical back: Normal range of motion. Skin:     General: Skin is warm. Neurological:      General: No focal deficit present. Mental Status: He is alert. Psychiatric:         Mood and Affect: Mood normal.         ASSESSMENT and PLAN    ICD-10-CM ICD-9-CM    1. Possible exposure to STD  Z20.2 V01.6 CHLAMYDIA / GC-AMPLIFIED     Encounter Diagnoses   Name Primary?     Possible exposure to STD Yes     Orders Placed This Encounter    CHLAMYDIA / GC-AMPLIFIED [[id number]] LAB COLLECT DEFAULT    ALPRAZolam (XANAX) 0.25 mg tablet   Discussed safe sex practices  Will call with result  Signed By: FAUSTO Washburn     July 18, 2022

## 2022-07-20 LAB
C TRACH RRNA SPEC QL NAA+PROBE: NEGATIVE
N GONORRHOEA RRNA SPEC QL NAA+PROBE: NEGATIVE

## 2022-07-21 NOTE — PROGRESS NOTES
Patient called. Used 2 identifiers. Reports negative test results. No further questions or concerns.

## 2022-07-23 ENCOUNTER — OFFICE VISIT (OUTPATIENT)
Dept: PRIMARY CARE CLINIC | Age: 27
End: 2022-07-23

## 2022-07-23 VITALS
WEIGHT: 192 LBS | OXYGEN SATURATION: 98 % | HEIGHT: 75 IN | TEMPERATURE: 96.9 F | HEART RATE: 64 BPM | SYSTOLIC BLOOD PRESSURE: 129 MMHG | DIASTOLIC BLOOD PRESSURE: 73 MMHG | RESPIRATION RATE: 16 BRPM | BODY MASS INDEX: 23.87 KG/M2

## 2022-07-23 DIAGNOSIS — J01.01 ACUTE RECURRENT MAXILLARY SINUSITIS: Primary | ICD-10-CM

## 2022-07-23 PROCEDURE — 99213 OFFICE O/P EST LOW 20 MIN: CPT | Performed by: FAMILY MEDICINE

## 2022-07-23 RX ORDER — AMOXICILLIN AND CLAVULANATE POTASSIUM 562.5; 437.5; 62.5 MG/1; MG/1; MG/1
2 TABLET, FILM COATED, EXTENDED RELEASE ORAL 2 TIMES DAILY
Qty: 28 TABLET | Refills: 0 | Status: SHIPPED | OUTPATIENT
Start: 2022-07-23 | End: 2022-07-30

## 2022-07-23 RX ORDER — AMOXICILLIN AND CLAVULANATE POTASSIUM 562.5; 437.5; 62.5 MG/1; MG/1; MG/1
2 TABLET, FILM COATED, EXTENDED RELEASE ORAL 2 TIMES DAILY
Qty: 28 TABLET | Refills: 0 | Status: CANCELLED | OUTPATIENT
Start: 2022-07-23 | End: 2022-07-30

## 2022-07-23 NOTE — PROGRESS NOTES
Chief Complaint:   Chief Complaint   Patient presents with    Sinus Infection     Was treated for a sinus infection 2 weeks days ago from [facility]; started feeling better after finishing the course of antibiotics but not having nasal and chest congestion; has not taken temperature but feels he does not have a fever. No environmental allergies. Subjective:      Danna Wetzel III is a 23 y.o. male who presents for evaluation of possible sinus infection. He had a sinus infection 2.5 wks ago [facility]in x10 days. Started feeling better on day 6 then back to normal after finishing the augmentin for a few days then symptoms started last Wednesday, reports nasal congestion and sinus pain and cough worsening since Wednesday. Cough productive. Right sinus pressure, dark green mucus. No fever or chills. He has tried neti-pot & mucinex sinuses. No sick contacts. Past Medical History:   Diagnosis Date    Asthma      Family History   Problem Relation Age of Onset    Anesth Problems Mother         VOMITING    Heart Disease Maternal Grandmother     Alcohol abuse Father      Current Outpatient Medications   Medication Sig Dispense Refill    ALPRAZolam (XANAX) 0.25 mg tablet Take 0.25 mg by mouth daily as needed. sertraline (ZOLOFT) 50 mg tablet       albuterol (VENTOLIN HFA) 90 mcg/actuation inhaler Take 2 Puffs by inhalation every four (4) hours as needed for Wheezing.  3 Inhaler 4     Allergies   Allergen Reactions    Doxycycline Unknown (comments)     Upsets stomach    Meperidine Rash and Hives     Social History     Socioeconomic History    Marital status: SINGLE     Spouse name: Not on file    Number of children: Not on file    Years of education: Not on file    Highest education level: Not on file   Occupational History    Not on file   Tobacco Use    Smoking status: Never    Smokeless tobacco: Never   Vaping Use    Vaping Use: Never used   Substance and Sexual Activity    Alcohol use: Yes     Alcohol/week: 1.0 standard drink     Types: 1 Shots of liquor per week     Comment: once a month    Drug use: Never    Sexual activity: Yes   Other Topics Concern    Not on file   Social History Narrative    Not on file     Social Determinants of Health     Financial Resource Strain: Not on file   Food Insecurity: Not on file   Transportation Needs: Not on file   Physical Activity: Not on file   Stress: Not on file   Social Connections: Not on file   Intimate Partner Violence: Not on file   Housing Stability: Not on file       Review of Systems  Pertinent items are noted in HPI. Objective:     Visit Vitals  /73   Pulse 64   Temp 96.9 °F (36.1 °C) (Temporal)   Resp 16   Ht 6' 3\" (1.905 m)   Wt 192 lb (87.1 kg)   SpO2 98%   BMI 24.00 kg/m²     General: Alert and oriented and in no acute distress. Responds to all questions appropriately. SKIN: No rash. HEAD: bilaterally maxillary sinus tenderness. EYES: Sclera and conjunctiva clear; pupils round and reactive to light. EARS: External normal, canals clear, tympanic membranes normal.     NOSE: Edema and erythema of the turbinates with yellow mucous drainage. OROPHARYNX: Slight tonsil edema and erythema with no exudate. NECK: Supple; no masses; normal lymphadenopathy. LUNGS: Clear to auscultation bilaterally, no wheeze, rales and rhonchi. CARDIOVASCULAR: Regular, rate, and rhythm without murmurs, gallops or rubs. NEUROLOGIC: Speech intact; face symmetrical; moves all extremities equally. Assessment:       ICD-10-CM ICD-9-CM    1. Acute recurrent maxillary sinusitis  J01.01 461.0 amoxicillin-clavulanate (AUGMENTIN) 1,000-62.5 mg ER tablet        [facility] 2[facility], symptoms returned a few days after finishing course of augmentin, ?recurrence vs new infection.  He cannot tolerate doxycycline (severe GI upset), will start high dose augmentin per orders, if unable to tolerate my switch to levofloxacin, if no improvement in 2-3 days advised to see ENT. Discussed OTCs, sinus rinses. Pt agrees with plan. Plan:     1. Nasal saline rinses as needed for congestion. 2. Follow-up  if symptoms worsen or persist.  3. Over-the-counter mediciations:    1. Ibuprofen (Motrin, Advil): 200mg - take 1-4 three times a day for 5 days. -OR-    Naproxin (Aleve): 220mg 1-2 tablets twice a day for 5 days. 2. Acetaminophen (Tylenol): 500mg 1-2 tablets every 6 hours as needed for pain. 3. Combination cough and decongestant medicine such as Mucinex D.       Juan Jose Kline MD  7/23/2022

## 2022-07-23 NOTE — PROGRESS NOTES
Chief Complaint   Patient presents with    Sinus Infection     Was treated for a sinus infection 2 weeks days ago from [facility]; started feeling better after finishing the course of antibiotics but not having nasal and chest congestion; has not taken temperature but feels he does not have a fever. No environmental allergies.    Visit Vitals  /73   Pulse 64   Temp 96.9 °F (36.1 °C) (Temporal)   Resp 16   Ht 6' 3\" (1.905 m)   Wt 192 lb (87.1 kg)   SpO2 98%   BMI 24.00 kg/m²

## 2022-12-21 ENCOUNTER — OFFICE VISIT (OUTPATIENT)
Dept: PRIMARY CARE CLINIC | Age: 27
End: 2022-12-21

## 2022-12-21 VITALS
HEIGHT: 75 IN | WEIGHT: 222 LBS | RESPIRATION RATE: 16 BRPM | DIASTOLIC BLOOD PRESSURE: 83 MMHG | OXYGEN SATURATION: 96 % | HEART RATE: 82 BPM | TEMPERATURE: 98.4 F | SYSTOLIC BLOOD PRESSURE: 119 MMHG | BODY MASS INDEX: 27.6 KG/M2

## 2022-12-21 DIAGNOSIS — B34.9 VIRAL ILLNESS: Primary | ICD-10-CM

## 2022-12-21 DIAGNOSIS — J45.990 EXERCISE-INDUCED ASTHMA: ICD-10-CM

## 2022-12-21 DIAGNOSIS — F41.1 GENERALIZED ANXIETY DISORDER: ICD-10-CM

## 2022-12-21 LAB — S PYO AG THROAT QL: NEGATIVE

## 2022-12-21 RX ORDER — ALBUTEROL SULFATE 90 UG/1
2 AEROSOL, METERED RESPIRATORY (INHALATION)
Qty: 3 EACH | Refills: 4 | Status: SHIPPED | OUTPATIENT
Start: 2022-12-21

## 2022-12-21 NOTE — PATIENT INSTRUCTIONS
- rest, push fluids  - take OTC ibuprofen 600-800mg every 8 hours for the next 3 days for symptom relief. Take with food.

## 2022-12-21 NOTE — PROGRESS NOTES
Identified pt with two pt identifiers(name and ). Reviewed record in preparation for visit and have obtained necessary documentation. Chief Complaint   Patient presents with    Sore Throat     X2 days; cough; home covid today - neg; took benadryl and rescue inhaler      Vitals:    22 1329   BP: 119/83   Pulse: 82   Resp: 16   Temp: 98.4 °F (36.9 °C)   TempSrc: Temporal   SpO2: 96%   Weight: 222 lb (100.7 kg)   Height: 6' 3\" (1.905 m)   PainSc:   0 - No pain       Health Maintenance Review: Patient reminded of \"due or due soon\" health maintenance. I have asked the patient to contact his/her primary care provider (PCP) for follow-up on his/her health maintenance. Coordination of Care Questionnaire:  :   1) Have you been to an emergency room, urgent care, or hospitalized since your last visit? If yes, where when, and reason for visit? no       2. Have seen or consulted any other health care provider since your last visit? If yes, where when, and reason for visit? NO      Patient is accompanied by self I have received verbal consent from Curtis Diaz III to discuss any/all medical information while they are present in the room.

## 2022-12-21 NOTE — PROGRESS NOTES
Chief Complaint   Patient presents with    Sore Throat     X2 days; cough; home covid today - neg; took benadryl and rescue inhaler     HISTORY OF PRESENT ILLNESS  Sade Jett III is a 33 y.o. male. Patient reports sore throat with cough that began 2 days ago. Patient has tried Benadryl, hot showers, Flonase with some improvement of symptoms. Patient states he had PCP appointment for tomorrow in which they canceled due to risk of covid. Patient states taking at home covid test yesterday that was negative. In addition appointment at PCP's office was to have albuterol inhaler and sertraline filled. Patient is in office today requesting to have inhaler filled and possibly sertraline. Review of Systems   Constitutional:  Negative for chills and fever. HENT:  Positive for sore throat. Negative for congestion. Eyes: Negative. Respiratory:  Positive for cough. Negative for shortness of breath and wheezing. Cardiovascular: Negative. Gastrointestinal: Negative. Genitourinary: Negative. Musculoskeletal: Negative. Skin: Negative. Neurological:  Negative for loss of consciousness. Endo/Heme/Allergies: Negative. Psychiatric/Behavioral: Negative. Physical Exam  Constitutional:       Appearance: Normal appearance. HENT:      Head: Normocephalic. Right Ear: Tympanic membrane normal.      Left Ear: Tympanic membrane normal.      Nose: Nose normal.      Mouth/Throat:      Mouth: Mucous membranes are moist.      Pharynx: Oropharynx is clear. Tonsils: No tonsillar exudate or tonsillar abscesses. 2+ on the right. 2+ on the left. Eyes:      Pupils: Pupils are equal, round, and reactive to light. Cardiovascular:      Rate and Rhythm: Normal rate. Pulses: Normal pulses. Heart sounds: Normal heart sounds. Pulmonary:      Effort: Pulmonary effort is normal. No respiratory distress. Breath sounds: Normal breath sounds. No wheezing, rhonchi or rales.    Abdominal: Palpations: Abdomen is soft. Musculoskeletal:         General: Normal range of motion. Cervical back: Normal range of motion. Skin:     General: Skin is warm. Neurological:      General: No focal deficit present. Mental Status: He is alert and oriented to person, place, and time. Psychiatric:         Mood and Affect: Mood normal.         Behavior: Behavior normal.     Past Medical History:   Diagnosis Date    Asthma      Past Surgical History:   Procedure Laterality Date    HX HEENT      WISDOM TEETH    HX ORTHOPAEDIC  2017    Left Hip Surgery     HX UROLOGICAL  1997    LEFT KIDNEY SURGERY FOR UPJ OBSTRUCTION     /83 (BP 1 Location: Left arm, BP Patient Position: Sitting)   Pulse 82   Temp 98.4 °F (36.9 °C) (Temporal)   Resp 16   Ht 6' 3\" (1.905 m)   Wt 222 lb (100.7 kg)   SpO2 96%   BMI 27.75 kg/m²   Results for orders placed or performed in visit on 12/21/22   AMB POC RAPID STREP TEST   Result Value Ref Range    Group A Strep Ag Negative Negative     ASSESSMENT and PLAN  1. Viral illness  -     AMB POC RAPID STREP TEST - declined strep culture. 2. Exercise-induced asthma         -     albuterol (Ventolin HFA) 90 mcg/actuation inhaler; Take 2 Puffs by inhalation every                 four (4) hours as needed for Wheezing., Normal, Disp-3 Each, R-4  3. Generalized anxiety disorder         -  Patient reports he has enough to last him until his PCP appointment on Jan 4, 2023.      Lindsey Jeong NP